# Patient Record
Sex: FEMALE | Race: WHITE | NOT HISPANIC OR LATINO | URBAN - METROPOLITAN AREA
[De-identification: names, ages, dates, MRNs, and addresses within clinical notes are randomized per-mention and may not be internally consistent; named-entity substitution may affect disease eponyms.]

---

## 2017-02-09 ENCOUNTER — APPOINTMENT (OUTPATIENT)
Dept: WOMENS IMAGING | Facility: HOSPITAL | Age: 62
End: 2017-02-09

## 2017-02-09 PROCEDURE — 77067 SCR MAMMO BI INCL CAD: CPT | Performed by: RADIOLOGY

## 2018-02-15 ENCOUNTER — APPOINTMENT (OUTPATIENT)
Dept: WOMENS IMAGING | Facility: HOSPITAL | Age: 63
End: 2018-02-15

## 2018-02-15 PROCEDURE — 77067 SCR MAMMO BI INCL CAD: CPT | Performed by: RADIOLOGY

## 2018-02-15 PROCEDURE — 77063 BREAST TOMOSYNTHESIS BI: CPT | Performed by: RADIOLOGY

## 2019-02-25 ENCOUNTER — APPOINTMENT (OUTPATIENT)
Dept: WOMENS IMAGING | Facility: HOSPITAL | Age: 64
End: 2019-02-25

## 2019-02-25 PROCEDURE — 77067 SCR MAMMO BI INCL CAD: CPT | Performed by: RADIOLOGY

## 2019-02-25 PROCEDURE — 77063 BREAST TOMOSYNTHESIS BI: CPT | Performed by: RADIOLOGY

## 2020-02-26 ENCOUNTER — APPOINTMENT (OUTPATIENT)
Dept: WOMENS IMAGING | Facility: HOSPITAL | Age: 65
End: 2020-02-26

## 2020-02-26 PROCEDURE — 77067 SCR MAMMO BI INCL CAD: CPT | Performed by: RADIOLOGY

## 2021-03-16 ENCOUNTER — BULK ORDERING (OUTPATIENT)
Dept: CASE MANAGEMENT | Facility: OTHER | Age: 66
End: 2021-03-16

## 2021-03-16 DIAGNOSIS — Z23 IMMUNIZATION DUE: ICD-10-CM

## 2021-03-22 ENCOUNTER — APPOINTMENT (OUTPATIENT)
Dept: WOMENS IMAGING | Facility: HOSPITAL | Age: 66
End: 2021-03-22

## 2021-03-22 PROCEDURE — 77067 SCR MAMMO BI INCL CAD: CPT | Performed by: RADIOLOGY

## 2022-10-14 ENCOUNTER — OFFICE VISIT (OUTPATIENT)
Dept: CARDIOLOGY | Facility: CLINIC | Age: 67
End: 2022-10-14

## 2022-10-14 VITALS
WEIGHT: 166 LBS | HEIGHT: 65 IN | SYSTOLIC BLOOD PRESSURE: 128 MMHG | HEART RATE: 63 BPM | DIASTOLIC BLOOD PRESSURE: 78 MMHG | BODY MASS INDEX: 27.66 KG/M2

## 2022-10-14 DIAGNOSIS — I63.50 CEREBROVASCULAR ACCIDENT (CVA) DUE TO OCCLUSION OF CEREBRAL ARTERY: Primary | ICD-10-CM

## 2022-10-14 PROCEDURE — 93000 ELECTROCARDIOGRAM COMPLETE: CPT | Performed by: INTERNAL MEDICINE

## 2022-10-14 PROCEDURE — 99204 OFFICE O/P NEW MOD 45 MIN: CPT | Performed by: INTERNAL MEDICINE

## 2022-10-14 RX ORDER — ESCITALOPRAM OXALATE 20 MG/1
40 TABLET ORAL DAILY
COMMUNITY
Start: 2022-06-02

## 2022-10-14 RX ORDER — ATORVASTATIN CALCIUM 80 MG/1
1 TABLET, FILM COATED ORAL DAILY
COMMUNITY
Start: 2022-10-12

## 2022-10-14 RX ORDER — ASPIRIN 81 MG/1
81 TABLET ORAL DAILY
COMMUNITY

## 2022-10-14 RX ORDER — CLOPIDOGREL BISULFATE 75 MG/1
1 TABLET ORAL DAILY
COMMUNITY
Start: 2022-09-25

## 2022-10-14 NOTE — PROGRESS NOTES
Subjective:     Encounter Date:10/14/2022      Patient ID: Larissa Shultz is a 66 y.o. female.    Chief Complaint: CVA  HPI:   This is a 66 year old woman who presents for evaluation of CVA. In March 2022 she had an episode of acute vision loss. She was seen at [facility] and diagnosed with TIA/CVA. CTA head/neck showed mild 15% left ICA stenosis and 30-45% right ICA stenosis. She was started on ASA, Plavix and Atorvastatin 80. She was seen by optho who diagnosed her with right retinal artery occlusion. She was seen by Dr. Altman in  who recommended a Linq recorder, however her insurance denied this.    She has since been seen by vascular surgery. Carotid ultrasound showed moderate right carotid disease. No surgical plans made. She has had one fall / syncopal episode resulting in a facial laceration in Augus 2022. She has no recollection though documentation from the ER shows that she was walking to the bathroom and lost consciousness.   The patient complains of a few minutes of tachycardia intermittently with no clear trigger. No angina or dyspnea. No dizziness. She does have recurrent headaches almost daily.     The following portions of the patient's history were reviewed and updated as appropriate: allergies, current medications, past family history, past medical history, past social history, past surgical history and problem list.     REVIEW OF SYSTEMS:   All systems reviewed.  Pertinent positives identified in HPI.  All other systems are negative.    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     per stephie mix office note 3/22-dd   • Syncope and collapse     per dr marely garcia  office note 2022-dd       Family History   Problem Relation Age of Onset   • Heart disease Mother    • Heart disease Father    • Heart disease Sister    • Heart disease Brother        Social History     Socioeconomic History   • Marital status:    Tobacco Use   • Smoking status: Former     Types: Cigarettes     Quit date:      Years since quittin.7   • Smokeless tobacco: Former   Vaping Use   • Vaping Use: Never used   Substance and Sexual Activity   • Alcohol use: Yes     Comment: 1-2 [employer] qd   • Drug use: Defer   • Sexual activity: Defer       Allergies   Allergen Reactions   • Codeine Unknown - High Severity     Headache   Headache      • Nitrofurantoin Unknown - High Severity   • Penicillins Hives and Swelling   • Sulfa Antibiotics Rash       Past Surgical History:   Procedure Laterality Date   • TUBAL ABDOMINAL LIGATION           ECG 12 Lead    Date/Time: 10/14/2022 2:25 PM  Performed by: Maria E Yee MD  Authorized by: Maria E Yee MD   Comparison: compared with previous ECG from 10/14/2022  Similar to previous ECG  Rhythm: sinus rhythm  Rate: normal  Conduction: incomplete right bundle branch block  ST Segments: ST segments normal  T Waves: T waves normal  QRS axis: normal  Other: no other findings    Clinical impression: non-specific ECG               Objective:         PHYSICAL EXAM:  GEN: VSS, no distress,   Eyes: normal sclera, normal lids and lashes  HENT: moist mucus membranes,   Respiratory: CTAB, no rales or wheezes  CV: RRR, no murmurs, , +2 DP and 2+ carotid pulses b/l  GI: NABS, soft,  Nontender, nondistended  MSK: no edema, no scoliosis or kyphosis  Skin: no rash, warm, dry  Heme/Lymph: no bruising or bleeding  Psych: organized thought, normal behavior and affect  Neuro:  Cranial nerves grossly intact, Alert and Oriented x 3.         Assessment:          Diagnosis Plan   1. Cerebrovascular accident (CVA) due to occlusion of cerebral artery (HCC)  Cardiac Event Monitor             Plan:       1. CVA, Palpitations, syncope: Will start with an event monitor. She has a variety of symptoms which could be related to arrhythmia. Based on these findings, we can decide whether she needs a Linq to further evaluate the cause of her CVA/ retinal artery occlusion.   2. Carotid disease: Will follow with vascular. DAPT and Statin.     Dr. Min, thank you very much for referring this kind patient to me. Please call me with any questions or concerns. I will see the patient again in the office in 6 months.          Maria E Yee MD  10/14/22  [facility]    Outpatient Encounter Medications as of 10/14/2022   Medication Sig Dispense Refill   • aspirin 81 MG EC tablet Take 1 tablet by mouth Daily.     • atorvastatin (LIPITOR) 80 MG tablet Take 1 tablet by mouth Daily.     • clopidogrel (PLAVIX) 75 MG tablet Take 1 tablet by mouth Daily.     • escitalopram (LEXAPRO) 20 MG tablet Take 2 tablets by mouth Daily.       No facility-administered encounter medications on file as of 10/14/2022.